# Patient Record
Sex: FEMALE | Race: OTHER | HISPANIC OR LATINO | URBAN - METROPOLITAN AREA
[De-identification: names, ages, dates, MRNs, and addresses within clinical notes are randomized per-mention and may not be internally consistent; named-entity substitution may affect disease eponyms.]

---

## 2024-01-11 ENCOUNTER — OUTPATIENT (OUTPATIENT)
Dept: INPATIENT UNIT | Facility: HOSPITAL | Age: 24
LOS: 1 days | End: 2024-01-11
Payer: MEDICAID

## 2024-01-11 VITALS — HEART RATE: 59 BPM | DIASTOLIC BLOOD PRESSURE: 68 MMHG | SYSTOLIC BLOOD PRESSURE: 112 MMHG

## 2024-01-11 VITALS — DIASTOLIC BLOOD PRESSURE: 71 MMHG | SYSTOLIC BLOOD PRESSURE: 116 MMHG | HEART RATE: 72 BPM

## 2024-01-11 DIAGNOSIS — O26.899 OTHER SPECIFIED PREGNANCY RELATED CONDITIONS, UNSPECIFIED TRIMESTER: ICD-10-CM

## 2024-01-11 LAB
HCT VFR BLD CALC: 34.5 % — SIGNIFICANT CHANGE UP (ref 34.5–45)
HCT VFR BLD CALC: 34.5 % — SIGNIFICANT CHANGE UP (ref 34.5–45)
HGB BLD-MCNC: 11.8 G/DL — SIGNIFICANT CHANGE UP (ref 11.5–15.5)
HGB BLD-MCNC: 11.8 G/DL — SIGNIFICANT CHANGE UP (ref 11.5–15.5)
HIV 1 & 2 AB SERPL IA.RAPID: SIGNIFICANT CHANGE UP
HIV 1 & 2 AB SERPL IA.RAPID: SIGNIFICANT CHANGE UP
MCHC RBC-ENTMCNC: 31.4 PG — SIGNIFICANT CHANGE UP (ref 27–34)
MCHC RBC-ENTMCNC: 31.4 PG — SIGNIFICANT CHANGE UP (ref 27–34)
MCHC RBC-ENTMCNC: 34.2 GM/DL — SIGNIFICANT CHANGE UP (ref 32–36)
MCHC RBC-ENTMCNC: 34.2 GM/DL — SIGNIFICANT CHANGE UP (ref 32–36)
MCV RBC AUTO: 91.8 FL — SIGNIFICANT CHANGE UP (ref 80–100)
MCV RBC AUTO: 91.8 FL — SIGNIFICANT CHANGE UP (ref 80–100)
PLATELET # BLD AUTO: 191 K/UL — SIGNIFICANT CHANGE UP (ref 150–400)
PLATELET # BLD AUTO: 191 K/UL — SIGNIFICANT CHANGE UP (ref 150–400)
RBC # BLD: 3.76 M/UL — LOW (ref 3.8–5.2)
RBC # BLD: 3.76 M/UL — LOW (ref 3.8–5.2)
RBC # FLD: 13.5 % — SIGNIFICANT CHANGE UP (ref 10.3–14.5)
RBC # FLD: 13.5 % — SIGNIFICANT CHANGE UP (ref 10.3–14.5)
WBC # BLD: 7.15 K/UL — SIGNIFICANT CHANGE UP (ref 3.8–10.5)
WBC # BLD: 7.15 K/UL — SIGNIFICANT CHANGE UP (ref 3.8–10.5)
WBC # FLD AUTO: 7.15 K/UL — SIGNIFICANT CHANGE UP (ref 3.8–10.5)
WBC # FLD AUTO: 7.15 K/UL — SIGNIFICANT CHANGE UP (ref 3.8–10.5)

## 2024-01-11 PROCEDURE — G0463: CPT

## 2024-01-11 PROCEDURE — 87070 CULTURE OTHR SPECIMN AEROBIC: CPT

## 2024-01-11 PROCEDURE — 59025 FETAL NON-STRESS TEST: CPT

## 2024-01-11 PROCEDURE — 36415 COLL VENOUS BLD VENIPUNCTURE: CPT

## 2024-01-11 PROCEDURE — 87389 HIV-1 AG W/HIV-1&-2 AB AG IA: CPT

## 2024-01-11 PROCEDURE — 86703 HIV-1/HIV-2 1 RESULT ANTBDY: CPT

## 2024-01-11 PROCEDURE — 86780 TREPONEMA PALLIDUM: CPT

## 2024-01-11 PROCEDURE — 85027 COMPLETE CBC AUTOMATED: CPT

## 2024-01-11 NOTE — OB RN TRIAGE NOTE - FALL HARM RISK - UNIVERSAL INTERVENTIONS
Bed in lowest position, wheels locked, appropriate side rails in place/Call bell, personal items and telephone in reach/Instruct patient to call for assistance before getting out of bed or chair/Non-slip footwear when patient is out of bed/[place] to call system/Physically safe environment - no spills, clutter or unnecessary equipment/Purposeful Proactive Rounding/Room/bathroom lighting operational, light cord in reach Bed in lowest position, wheels locked, appropriate side rails in place/Call bell, personal items and telephone in reach/Instruct patient to call for assistance before getting out of bed or chair/Non-slip footwear when patient is out of bed/[place] to call system/Physically safe environment - no spills, clutter or unnecessary equipment/Purposeful Proactive Rounding/Room/bathroom lighting operational, light cord in reach

## 2024-01-11 NOTE — OB PROVIDER TRIAGE NOTE - HISTORY OF PRESENT ILLNESS
ANGELO MACE is a 23y  at 38w3d weeks GA who presents to L&D for pelvic pressure and to establish care. Patient has been up to date with PNC in [facility] for the first 7 months of pregnancy, and was seen in [place] for the last month, but recently moved to [place] and is looking to establish care. Patient feels well, and has no issues with pregnancy    Pt denies vaginal bleeding, contractions and leakage of fluid. She endorses good fetal movement.   Pt denies trauma.   Pt denies headaches, visual disturbances, RUQ pain, epigastric pain and new-onset edema.   She denies any urinary complaints.   She denies fevers, chills, nausea, vomiting.   She denies shortness of breath, chest pain, and palpitations.    Pregnancy course is  uncomplicated.     POB: G1  PGYN: -fibroids/-cysts, denied STD hx, denies abnormal PAPs  PMH: Denies  PSH: Denies  SH: Denies tobacco use, EtOH use and illicit drug use during the pregnancy; Feels safe at home  Meds: PNV  All: NKDA         ANGELO MACE is a 23y  at 38w3d weeks GA who presents to L&D for pelvic pressure and to establish care. Patient has been up to date with PNC in [facility] for the first 7 months of pregnancy, and was seen in [place] for the last month, but recently moved to [place] and is looking to establish care. Patient feels well, and has no issues with pregnancy    Pt denies vaginal bleeding, contractions and leakage of fluid. She endorses good fetal movement.   Pt denies trauma.   Pt denies headaches, visual disturbances, RUQ pain, epigastric pain and new-onset edema.   She denies any urinary complaints.   She denies fevers, chills, nausea, vomiting.   She denies shortness of breath, chest pain, and palpitations.    Pregnancy course is  uncomplicated.     POB: G1  PGYN: -fibroids/-cysts, denied STD hx, denies abnormal PAPs  PMH: Denies  PSH: Denies  SH: Denies tobacco use, EtOH use and illicit drug use during the pregnancy; Feels safe at home  Meds: PNV  All: NKDA         ANGELO MACE is a 23y  at 38w3d weeks GA who presents to L&D for pelvic pressure and to establish care. Patient has been up to date with PNC in [facility] for the first 7 months of pregnancy, and was seen in [place] for the last month, but recently moved to [place] and is looking to establish care. Patient feels well, and has no issues with pregnancy    Pt denies vaginal bleeding, contractions and leakage of fluid. She endorses good fetal movement.   Pt denies trauma.   Pt denies headaches, visual disturbances, RUQ pain, epigastric pain and new-onset edema.   She denies any urinary complaints.   She denies fevers, chills, nausea, vomiting.   She denies shortness of breath, chest pain, and palpitations.    Pregnancy course is  uncomplicated.     POB: G1  PGYN: -fibroids/-cysts, denied STD hx, denies abnormal PAPs  PMH: Denies  PSH: Denies  SH: Denies tobacco use, EtOH use and illicit drug use during the pregnancy; Feels safe at home  Meds: PNV  All: NKDA         ANGELO MACE is a 23y  at 38w3d weeks GA who presents to L&D for pelvic pressure and to establish care. Patient has been up to date with PNC in [facility] for the first 7 months of pregnancy, and was seen in [place] for the last month, but recently moved to [place] and is looking to establish care. Patient feels well, and has no issues with pregnancy    Pt denies vaginal bleeding, contractions and leakage of fluid. She endorses good fetal movement.   Pt denies trauma.   Pt denies headaches, visual disturbances, RUQ pain, epigastric pain and new-onset edema.   She denies any urinary complaints.   She denies fevers, chills, nausea, vomiting.   She denies shortness of breath, chest pain, and palpitations.    Pregnancy course is  uncomplicated.     POB: G1  PGYN: -fibroids/-cysts, denied STD hx, denies abnormal PAPs  PMH: Denies  PSH: Denies  SH: Denies tobacco use, EtOH use and illicit drug use during the pregnancy; Feels safe at home  Meds: PNV  All: NKDA

## 2024-01-11 NOTE — OB PROVIDER TRIAGE NOTE - NSHPPHYSICALEXAM_GEN_ALL_CORE
T(C): 36.7 (01-11-24 @ 17:23), Max: 36.7 (01-11-24 @ 17:23)  HR: 72 (01-11-24 @ 17:23) (72 - 72)  BP: 116/71 (01-11-24 @ 17:23) (116/71 - 116/71)  RR: 20 (01-11-24 @ 17:23) (20 - 20)    Gen: NAD, well-appearing  Heart: S1 S2, RRR  Lungs: CTAB  Abd: soft, gravid  Ext: non-edematous, non-tender   SVE: 0/0/-3  SSE: cervix visualized, closed and without any signs of bleeding or drainage, no pooling   FHT: 120 baseline, moderate variability, + accels, -decels  [place]:No contractions T(C): 36.7 (01-11-24 @ 17:23), Max: 36.7 (01-11-24 @ 17:23)  HR: 72 (01-11-24 @ 17:23) (72 - 72)  BP: 116/71 (01-11-24 @ 17:23) (116/71 - 116/71)  RR: 20 (01-11-24 @ 17:23) (20 - 20)    Gen: NAD, well-appearing  Heart: S1 S2, RRR  Lungs: CTAB  Abd: soft, gravid  Ext: non-edematous, non-tender   SVE: 0/0/-3  SSE: cervix visualized, closed and without any signs of bleeding or drainage, no pooling   FHT: 120 baseline, moderate variability, + accels, -decels  [place]:No contractions T(C): 36.7 (01-11-24 @ 17:23), Max: 36.7 (01-11-24 @ 17:23)  HR: 72 (01-11-24 @ 17:23) (72 - 72)  BP: 116/71 (01-11-24 @ 17:23) (116/71 - 116/71)  RR: 20 (01-11-24 @ 17:23) (20 - 20)    Gen: NAD, well-appearing  Heart: S1 S2, RRR  Lungs: CTAB  Abd: soft, gravid  Ext: non-edematous, non-tender   SVE: 0/0/-3  SSE: cervix visualized, closed and without any signs of bleeding or drainage, no pooling   FHT: 120 baseline, moderate variability, + accels, -decels  [place]:No contractions T(C): 36.7 (01-11-24 @ 17:23), Max: 36.7 (01-11-24 @ 17:23)  HR: 72 (01-11-24 @ 17:23) (72 - 72)  BP: 116/71 (01-11-24 @ 17:23) (116/71 - 116/71)  RR: 20 (01-11-24 @ 17:23) (20 - 20)    Gen: NAD, well-appearing  Heart: S1 S2, RRR  Lungs: CTAB  Abd: soft, gravid  Ext: non-edematous, non-tender   SVE: 0/0/-3  SSE: cervix visualized, closed and without any signs of bleeding or drainage, no pooling   FHT: 120 baseline, moderate variability, + accels, -decels  [place]:No contractions

## 2024-01-11 NOTE — OB RN TRIAGE NOTE - NS_DISCHARGEPRINT_OBGYN_ALL_OB
[place] [facility] OB Triage Discharge Instructions [place] [facility] OB Triage Discharge Instructions

## 2024-01-11 NOTE — OB RN TRIAGE NOTE - NSNAMEOFMDOFFICE_OBGYN_ALL_OB_FT
provider in [facility] provider in [facility] provider in [facility], provider also in [place] (has some records) provider in [facility], provider also in [place] (has some records)

## 2024-01-11 NOTE — OB PROVIDER TRIAGE NOTE - NSOBPROVIDERNOTE_OBGYN_ALL_OB_FT
A/P: ANGELO MACE is a 23y  at 38w3d weeks GA who presents to L&D for pelvic pressure and to establish care    SVE 0/0/-3  36 week labs collected: CBC, RPR, GBS, HIV  EFW: pending  Fetus: Reactive and reassuring  [place]: No contractions  Dispo: Refer to Dr. Dior to establish care, Discharge home with Labor precautions    Discussed with Dr. Dior, Dr. Spencer A/P: ANGELO MACE is a 23y  at 38w3d weeks GA who presents to L&D for pelvic pressure and to establish care    SVE 0/0/-3  36 week labs collected: CBC, RPR, GBS, HIV  EFW: pending  Fetus: Reactive and reassuring  [place]: No contractions  Dispo: Refer to Dr. Dior to establish care, Discharge home with Labor precautions    Discussed with Dr. Dior, Dr. Spencer A/P: ANGELO MACE is a 23y  at 38w3d weeks GA who presents to L&D for pelvic pressure and to establish care    SVE 0/0/-3  36 week labs collected: CBC, RPR, GBS, HIV  EFW: pending  Fetus: Reactive and reassuring  [place]: No contractions  Dispo: Refer to Dr. Dior to establish care, Discharge home with Labor precautions    Discussed with Dr. Dior, Dr. Spencer A/P: ANGELO MACE is a 23y  at 38w3d weeks GA who presents to L&D for pelvic pressure and to establish care    SVE 0/0/-3  36 week labs collected: CBC, RPR, GBS, HIV  EFW: pending  Fetus: Reactive and reassuring  [place]: No contractions  Dispo: Refer to Dr. Dior to establish care, Discharge home with Labor precautions    Discussed with Dr. Dior, Dr. Spencer A/P: ANGELO MACE is a 23y  at 38w3d weeks GA who presents to L&D for pelvic pressure and to establish care    SVE 0/0/-3  36 week labs collected: CBC, RPR, GBS, HIV  EFW: 2112g measuring at 32 weeks, should have follow up MFM ultrasound given mismatch in dates  Vertex, posterior, MACKENZIE 8.4cm  Fetus: Reactive and reassuring  [place]: No contractions  Dispo: Refer to Dr. Dior to establish care, Discharge home with Labor precautions    Discussed with Dr. Dior, Dr. Spencer A/P: ANGELO MACE is a 23y  at 38w3d weeks GA who presents to L&D for pelvic pressure and to establish care    SVE 0/0/-3  36 week labs collected: CBC, RPR, GBS, HIV  EFW: 2112g measuring at 32 weeks, should have follow up MFM ultrasound given mismatch in dates  Vertex, posterior, MACKENZIE 8.4cm  Fetus: Reactive and reassuring  [place]: No contractions  Dispo: Refer to Dr. Dior to establish care, Discharge home with Labor precautions    Discussed with Dr. Dior, Dr. Spencer A/P: ANGELO MACE is a 23y  at 38w3d weeks GA who presents to L&D for pelvic pressure and to establish care    SVE 0/0/-3  36 week labs collected: CBC, RPR, GBS, HIV  EFW: 2112g measuring at 32 weeks, should have follow up MFM ultrasound given mismatch in dates  Vertex, posterior, MACKENZIE 8.4cm  Fetus: Reactive and reassuring  [place]: No contractions  Dispo: Refer to Dr. Dior to establish care, Discharge home with Labor precautions    Discussed with Dr. Dior, Dr. Spencer A/P: ANGELO MACE is a 23y  at 38w3d weeks GA who presents to L&D for pelvic pressure and to establish care    SVE 0/0/-3  36 week labs collected: CBC, RPR, GBS, HIV  EFW: 2112g measuring at 32 weeks, should have follow up MFM ultrasound given mismatch in dates  Vertex, posterior, MACKENZIE 8.4cm  Fetus: Reactive and reassuring  [place]: No contractions  Dispo: Refer to Dr. Dior to establish care, Discharge home with Labor precautions    Discussed with Dr. Dior, Dr. Spencer

## 2024-01-12 DIAGNOSIS — O26.893 OTHER SPECIFIED PREGNANCY RELATED CONDITIONS, THIRD TRIMESTER: ICD-10-CM

## 2024-01-12 DIAGNOSIS — Z3A.38 38 WEEKS GESTATION OF PREGNANCY: ICD-10-CM

## 2024-01-12 DIAGNOSIS — R10.2 PELVIC AND PERINEAL PAIN: ICD-10-CM

## 2024-01-12 LAB
HIV 1+2 AB+HIV1 P24 AG SERPL QL IA: SIGNIFICANT CHANGE UP
HIV 1+2 AB+HIV1 P24 AG SERPL QL IA: SIGNIFICANT CHANGE UP
T PALLIDUM AB TITR SER: NEGATIVE — SIGNIFICANT CHANGE UP
T PALLIDUM AB TITR SER: NEGATIVE — SIGNIFICANT CHANGE UP

## 2024-01-14 LAB
CULTURE RESULTS: SIGNIFICANT CHANGE UP
CULTURE RESULTS: SIGNIFICANT CHANGE UP
SPECIMEN SOURCE: SIGNIFICANT CHANGE UP
SPECIMEN SOURCE: SIGNIFICANT CHANGE UP

## 2024-01-23 ENCOUNTER — INPATIENT (INPATIENT)
Facility: HOSPITAL | Age: 24
LOS: 1 days | Discharge: HOME | End: 2024-01-25
Attending: OBSTETRICS & GYNECOLOGY | Admitting: OBSTETRICS & GYNECOLOGY
Payer: MEDICAID

## 2024-01-23 VITALS — SYSTOLIC BLOOD PRESSURE: 120 MMHG | HEART RATE: 64 BPM | DIASTOLIC BLOOD PRESSURE: 76 MMHG

## 2024-01-23 DIAGNOSIS — O26.899 OTHER SPECIFIED PREGNANCY RELATED CONDITIONS, UNSPECIFIED TRIMESTER: ICD-10-CM

## 2024-01-23 DIAGNOSIS — O26.893 OTHER SPECIFIED PREGNANCY RELATED CONDITIONS, THIRD TRIMESTER: ICD-10-CM

## 2024-01-23 PROBLEM — Z78.9 OTHER SPECIFIED HEALTH STATUS: Chronic | Status: ACTIVE | Noted: 2024-01-11

## 2024-01-23 LAB
ABO RH CONFIRMATION: SIGNIFICANT CHANGE UP
BASOPHILS # BLD AUTO: 0.03 K/UL — SIGNIFICANT CHANGE UP (ref 0–0.2)
BASOPHILS NFR BLD AUTO: 0.3 % — SIGNIFICANT CHANGE UP (ref 0–2)
BLD GP AB SCN SERPL QL: SIGNIFICANT CHANGE UP
EOSINOPHIL # BLD AUTO: 0.03 K/UL — SIGNIFICANT CHANGE UP (ref 0–0.5)
EOSINOPHIL NFR BLD AUTO: 0.3 % — SIGNIFICANT CHANGE UP (ref 0–6)
HCT VFR BLD CALC: 41.6 % — SIGNIFICANT CHANGE UP (ref 34.5–45)
HGB BLD-MCNC: 14 G/DL — SIGNIFICANT CHANGE UP (ref 11.5–15.5)
IMM GRANULOCYTES NFR BLD AUTO: 0.7 % — SIGNIFICANT CHANGE UP (ref 0–0.9)
LYMPHOCYTES # BLD AUTO: 1.77 K/UL — SIGNIFICANT CHANGE UP (ref 1–3.3)
LYMPHOCYTES # BLD AUTO: 16.7 % — SIGNIFICANT CHANGE UP (ref 13–44)
MCHC RBC-ENTMCNC: 29.9 PG — SIGNIFICANT CHANGE UP (ref 27–34)
MCHC RBC-ENTMCNC: 33.7 GM/DL — SIGNIFICANT CHANGE UP (ref 32–36)
MCV RBC AUTO: 88.9 FL — SIGNIFICANT CHANGE UP (ref 80–100)
MONOCYTES # BLD AUTO: 0.38 K/UL — SIGNIFICANT CHANGE UP (ref 0–0.9)
MONOCYTES NFR BLD AUTO: 3.6 % — SIGNIFICANT CHANGE UP (ref 2–14)
NEUTROPHILS # BLD AUTO: 8.34 K/UL — HIGH (ref 1.8–7.4)
NEUTROPHILS NFR BLD AUTO: 78.4 % — HIGH (ref 43–77)
PLATELET # BLD AUTO: 231 K/UL — SIGNIFICANT CHANGE UP (ref 150–400)
RBC # BLD: 4.68 M/UL — SIGNIFICANT CHANGE UP (ref 3.8–5.2)
RBC # FLD: 13.8 % — SIGNIFICANT CHANGE UP (ref 10.3–14.5)
WBC # BLD: 10.62 K/UL — HIGH (ref 3.8–10.5)
WBC # FLD AUTO: 10.62 K/UL — HIGH (ref 3.8–10.5)

## 2024-01-23 PROCEDURE — 88307 TISSUE EXAM BY PATHOLOGIST: CPT | Mod: 26

## 2024-01-23 RX ORDER — OXYTOCIN 10 UNIT/ML
VIAL (ML) INJECTION
Qty: 30 | Refills: 0 | Status: DISCONTINUED | OUTPATIENT
Start: 2024-01-23 | End: 2024-01-24

## 2024-01-23 RX ORDER — SIMETHICONE 80 MG/1
80 TABLET, CHEWABLE ORAL EVERY 4 HOURS
Refills: 0 | Status: DISCONTINUED | OUTPATIENT
Start: 2024-01-23 | End: 2024-01-25

## 2024-01-23 RX ORDER — BENZOCAINE 10 %
1 GEL (GRAM) MUCOUS MEMBRANE EVERY 6 HOURS
Refills: 0 | Status: DISCONTINUED | OUTPATIENT
Start: 2024-01-23 | End: 2024-01-25

## 2024-01-23 RX ORDER — OXYTOCIN 10 UNIT/ML
41.67 VIAL (ML) INJECTION
Qty: 20 | Refills: 0 | Status: DISCONTINUED | OUTPATIENT
Start: 2024-01-23 | End: 2024-01-25

## 2024-01-23 RX ORDER — ONDANSETRON 8 MG/1
4 TABLET, FILM COATED ORAL ONCE
Refills: 0 | Status: COMPLETED | OUTPATIENT
Start: 2024-01-23 | End: 2024-01-23

## 2024-01-23 RX ORDER — TETANUS TOXOID, REDUCED DIPHTHERIA TOXOID AND ACELLULAR PERTUSSIS VACCINE, ADSORBED 5; 2.5; 8; 8; 2.5 [IU]/.5ML; [IU]/.5ML; UG/.5ML; UG/.5ML; UG/.5ML
0.5 SUSPENSION INTRAMUSCULAR ONCE
Refills: 0 | Status: DISCONTINUED | OUTPATIENT
Start: 2024-01-23 | End: 2024-01-25

## 2024-01-23 RX ORDER — CITRIC ACID/SODIUM CITRATE 300-500 MG
30 SOLUTION, ORAL ORAL ONCE
Refills: 0 | Status: DISCONTINUED | OUTPATIENT
Start: 2024-01-23 | End: 2024-01-24

## 2024-01-23 RX ORDER — IBUPROFEN 200 MG
600 TABLET ORAL EVERY 6 HOURS
Refills: 0 | Status: COMPLETED | OUTPATIENT
Start: 2024-01-23 | End: 2024-12-21

## 2024-01-23 RX ORDER — SODIUM CHLORIDE 9 MG/ML
3 INJECTION INTRAMUSCULAR; INTRAVENOUS; SUBCUTANEOUS EVERY 8 HOURS
Refills: 0 | Status: DISCONTINUED | OUTPATIENT
Start: 2024-01-23 | End: 2024-01-25

## 2024-01-23 RX ORDER — LANOLIN
1 OINTMENT (GRAM) TOPICAL EVERY 6 HOURS
Refills: 0 | Status: DISCONTINUED | OUTPATIENT
Start: 2024-01-23 | End: 2024-01-25

## 2024-01-23 RX ORDER — MAGNESIUM HYDROXIDE 400 MG/1
30 TABLET, CHEWABLE ORAL
Refills: 0 | Status: DISCONTINUED | OUTPATIENT
Start: 2024-01-23 | End: 2024-01-25

## 2024-01-23 RX ORDER — SODIUM CHLORIDE 9 MG/ML
1000 INJECTION, SOLUTION INTRAVENOUS
Refills: 0 | Status: DISCONTINUED | OUTPATIENT
Start: 2024-01-23 | End: 2024-01-24

## 2024-01-23 RX ORDER — KETOROLAC TROMETHAMINE 30 MG/ML
30 SYRINGE (ML) INJECTION ONCE
Refills: 0 | Status: DISCONTINUED | OUTPATIENT
Start: 2024-01-23 | End: 2024-01-23

## 2024-01-23 RX ORDER — DIBUCAINE 1 %
1 OINTMENT (GRAM) RECTAL EVERY 6 HOURS
Refills: 0 | Status: DISCONTINUED | OUTPATIENT
Start: 2024-01-23 | End: 2024-01-25

## 2024-01-23 RX ORDER — HYDROCORTISONE 1 %
1 OINTMENT (GRAM) TOPICAL EVERY 6 HOURS
Refills: 0 | Status: DISCONTINUED | OUTPATIENT
Start: 2024-01-23 | End: 2024-01-25

## 2024-01-23 RX ORDER — AER TRAVELER 0.5 G/1
1 SOLUTION RECTAL; TOPICAL EVERY 4 HOURS
Refills: 0 | Status: DISCONTINUED | OUTPATIENT
Start: 2024-01-23 | End: 2024-01-25

## 2024-01-23 RX ORDER — OXYCODONE HYDROCHLORIDE 5 MG/1
5 TABLET ORAL
Refills: 0 | Status: DISCONTINUED | OUTPATIENT
Start: 2024-01-23 | End: 2024-01-25

## 2024-01-23 RX ORDER — PRAMOXINE HYDROCHLORIDE 150 MG/15G
1 AEROSOL, FOAM RECTAL EVERY 4 HOURS
Refills: 0 | Status: DISCONTINUED | OUTPATIENT
Start: 2024-01-23 | End: 2024-01-25

## 2024-01-23 RX ORDER — CHLORHEXIDINE GLUCONATE 213 G/1000ML
1 SOLUTION TOPICAL DAILY
Refills: 0 | Status: DISCONTINUED | OUTPATIENT
Start: 2024-01-23 | End: 2024-01-24

## 2024-01-23 RX ORDER — DIPHENHYDRAMINE HCL 50 MG
25 CAPSULE ORAL EVERY 6 HOURS
Refills: 0 | Status: DISCONTINUED | OUTPATIENT
Start: 2024-01-23 | End: 2024-01-25

## 2024-01-23 RX ORDER — OXYCODONE HYDROCHLORIDE 5 MG/1
5 TABLET ORAL ONCE
Refills: 0 | Status: DISCONTINUED | OUTPATIENT
Start: 2024-01-23 | End: 2024-01-25

## 2024-01-23 RX ORDER — ACETAMINOPHEN 500 MG
975 TABLET ORAL
Refills: 0 | Status: DISCONTINUED | OUTPATIENT
Start: 2024-01-23 | End: 2024-01-25

## 2024-01-23 RX ORDER — OXYTOCIN 10 UNIT/ML
VIAL (ML) INJECTION
Qty: 20 | Refills: 0 | Status: COMPLETED | OUTPATIENT
Start: 2024-01-23 | End: 2024-01-23

## 2024-01-23 RX ADMIN — ONDANSETRON 4 MILLIGRAM(S): 8 TABLET, FILM COATED ORAL at 22:28

## 2024-01-23 RX ADMIN — Medication 1000 MILLIUNIT(S)/MIN: at 22:28

## 2024-01-23 RX ADMIN — Medication 30 MILLIGRAM(S): at 22:28

## 2024-01-23 RX ADMIN — SODIUM CHLORIDE 125 MILLILITER(S): 9 INJECTION, SOLUTION INTRAVENOUS at 16:44

## 2024-01-23 NOTE — OB PROVIDER LABOR PROGRESS NOTE - NS_SUBJECTIVE/OBJECTIVE_OBGYN_ALL_OB_FT
Patient resting comfortable
Pt seen bedside
Patient in discomfort, does not want epidural at this time  Agreeable to AROM

## 2024-01-23 NOTE — OB RN PATIENT PROFILE - FUNCTIONAL SCREEN CURRENT LEVEL: SWALLOWING (IF SCORE 2 OR MORE FOR ANY ITEM, CONSULT REHAB SERVICES), MLM)
Is This A New Presentation, Or A Follow-Up?: Skin Lesions
How Severe Is Your Skin Lesion?: mild
Has Your Skin Lesion Been Treated?: not been treated
0 = swallows foods/liquids without difficulty

## 2024-01-23 NOTE — OB RN PATIENT PROFILE - FUNCTIONAL ASSESSMENT - BASIC MOBILITY 4.
Propranolol Counseling:  I discussed with the patient the risks of propranolol including but not limited to low heart rate, low blood pressure, low blood sugar, restlessness and increased cold sensitivity. They should call the office if they experience any of these side effects. 4 = No assist / stand by assistance

## 2024-01-23 NOTE — OB PROVIDER LABOR PROGRESS NOTE - ASSESSMENT
A/P:  10/100/0  will labor down 30-40 min and then begin pushing as patient does not currently have an urge to push.
Cat 1 tracing  AROM, clear fluid  Consider pitocin if CTX space  reexam as indicated
Continue expt management

## 2024-01-23 NOTE — OB PROVIDER DELIVERY SUMMARY - NSPROVIDERDELIVERYNOTE_OBGYN_ALL_OB_FT
Patient noted to be fully dilated, and after a period of pushing, patient was prepped and draped for delivery. In conjunction with maternal effort, she delivered a viable male infant. Head delivered OA, nuchal cord x1 delivered through. After 30 sec, delayed cord clamping was performed then  was provided to bedside pediatric team. Cord blood collected for blood gas and G6PD deficiency testing. Placenta delivered spontaneously and intact, no gross pathology noted. Perineum and vagina were inspected and hemostatic abrasions were noted. Excellent hemostasis obtained.    EBL 156cc  Sex: M, Delivery Time: ,  Weight: 2990g, APGARs: 9/9 Patient noted to be fully dilated, and after a period of pushing, patient was prepped and draped for delivery. In conjunction with maternal effort, she delivered a viable male infant. Head delivered OA, nuchal cord x1 delivered through. After 30 sec, delayed cord clamping was performed then  was provided to bedside pediatric team. Cord blood collected for blood gas and G6PD deficiency testing. Placenta delivered spontaneously and intact, no gross pathology noted. Perineum and vagina were inspected and a left labial tear was noted and repaired with 2-0 Vicryl. Excellent hemostasis obtained.    EBL 156cc  Sex: M, Delivery Time: , [place] Weight: 2990g, APGARs: 9/9

## 2024-01-23 NOTE — OB PROVIDER H&P - NSHPPHYSICALEXAM_GEN_ALL_CORE
Vital Signs Last 24 Hrs  T(C): 37.1 (23 Jan 2024 11:45), Max: 37.1 (23 Jan 2024 11:45)  T(F): 98.8 (23 Jan 2024 11:45), Max: 98.8 (23 Jan 2024 11:45)  HR: 64 (23 Jan 2024 11:45) (64 - 64)  BP: 120/76 (23 Jan 2024 11:45) (120/76 - 120/76)    RR: 18 (23 Jan 2024 11:45) (18 - 18)          Gen: NAD, well-appearing  Abd: soft, gravid  Ext: non-edematous, non-tender   SVE: 3/90/-2    FHT: 120 baseline, moderate variability, + accels, -decels  [place]: Irregular contractions

## 2024-01-23 NOTE — OB PROVIDER H&P - ASSESSMENT
A/P: ANGELO MACE is a 23y  at 40w2d weeks GA who presents to L&D for labor at term  -Patient has limited care  -Admit to L&D  -Consent  -Admission labs  -NPO, except ice chips   -IV fluids  -Labor: Intact. Latent. Amanda q3-4 min.   -Fetus: Cat I tracing. Continuous toco and fetal monitoring.   -GBS: Negative, no GBS ppx required   -Analgesia: PRN  -DVT ppx: Ambulate and SCD's while in bed     Discussed with Dr. Wiseman

## 2024-01-23 NOTE — OB RN DELIVERY SUMMARY - NS_SEPSISRSKCALC_OBGYN_ALL_OB_FT
EOS calculated successfully. EOS Risk Factor: 0.5/1000 live births ([facility] national incidence); GA=40w1d; Temp=98.8; ROM=7.083; GBS='Unknown'; Antibiotics='No antibiotics or any antibiotics < 2 hrs prior to birth'

## 2024-01-23 NOTE — OB PROVIDER H&P - NSLOWPPHRISK_OBGYN_A_OB
No previous uterine incision/[place] Pregnancy/Less than or equal to 4 previous vaginal births/No known bleeding disorder/No history of postpartum hemorrhage/No other PPH risks indicated

## 2024-01-23 NOTE — OB PROVIDER H&P - HIV: DATE, OB PROFILE
Refill Routing Note   Medication(s) are not appropriate for processing by [facility] for the following reason(s):        Non-participating provider    [facility] action(s):  Route               Appointments  past 12m or future 3m with PCP    Date Provider   Last Visit   Visit date not found Bib Vasquez MD   Next Visit   Visit date not found Bib Vasquez MD   ED visits in past 90 days: 0        Note composed:4:19 PM 01/19/2024          
11-Jan-2024

## 2024-01-23 NOTE — OB RN DELIVERY SUMMARY - NSSELHIDDEN_OBGYN_ALL_OB_FT
[NS_DeliveryAttending1_OBGYN_ALL_OB_FT:[email or web address]],[NS_DeliveryAssist1_OBGYN_ALL_OB_FT:[id number]=],[NS_DeliveryRN_OBGYN_ALL_OB_FT:[id number]=]

## 2024-01-23 NOTE — OB PROVIDER DELIVERY SUMMARY - NSSELHIDDEN_OBGYN_ALL_OB_FT
[NS_DeliveryAttending1_OBGYN_ALL_OB_FT:[email or web address]],[NS_DeliveryAssist1_OBGYN_ALL_OB_FT:[id number]=],[NS_DeliveryRN_OBGYN_ALL_OB_FT:[id number]=]

## 2024-01-23 NOTE — OB PROVIDER H&P - HISTORY OF PRESENT ILLNESS
ANGELO MACE is a 23y  at 40w2d weeks GA who presents to L&D for pelvic pressure and contractions. Patient has been up to date with PNC in [facility] for the first 7 months of pregnancy, and was seen in [place] for the last month, but recently moved to [place] and is established care with [facility] yesterday, dating was confirmed with [facility] sonogram. Patient feels well, and has no issues with pregnancy    Pt denies vaginal bleeding, contractions and leakage of fluid. She endorses good fetal movement.     Pregnancy course is  uncomplicated.     POB: G1  PGYN: -fibroids/-cysts, denied STD hx, denies abnormal PAPs  PMH: Denies  PSH: Denies  SH: Denies tobacco use, EtOH use and illicit drug use during the pregnancy; Feels safe at home  Meds: PNV  All: NKDA

## 2024-01-23 NOTE — OB RN DELIVERY SUMMARY - NS_GENERALBABYACOMMENTA_OBGYN_ALL_OB_FT
Patient did not want to do skin to skin immediately at birth because she was vomitting and having a repair done

## 2024-01-23 NOTE — OB RN PATIENT PROFILE - SPIRITUAL CULTURAL, CURRENT SITUATION, PROFILE
[place] - Telemetry  Discharge Reassessment    Primary Care Provider: [facility] - Family Medicine    Expected Discharge Date: Pending    FELIZ spoke to patient's wife, Aminta about discharge planning Aminta stated that she is patient's help at home. FELIZ notified Aminta that patient may discharge home on PO abx.    Reassessment (most recent)       Discharge Reassessment - 09/12/22 1610          Discharge Reassessment    Assessment Type Discharge Planning Reassessment     Did the patient's condition or plan change since previous assessment? No     Discharge Plan discussed with: Patient     Communicated SUSAN with patient/caregiver Date not available/Unable to determine     Discharge Plan A Home with family     Discharge Plan B Home with family     DME Needed Upon Discharge  none     Discharge Barriers Identified None     Why the patient remains in the hospital Requires continued medical care        Post-Acute Status    Coverage Medicaid     Discharge Delays None known at this time                        No

## 2024-01-23 NOTE — OB RN TRIAGE NOTE - FALL HARM RISK - UNIVERSAL INTERVENTIONS
Bed in lowest position, wheels locked, appropriate side rails in place/Call bell, personal items and telephone in reach/Instruct patient to call for assistance before getting out of bed or chair/Non-slip footwear when patient is out of bed/[place] to call system/Physically safe environment - no spills, clutter or unnecessary equipment/Purposeful Proactive Rounding/Room/bathroom lighting operational, light cord in reach

## 2024-01-23 NOTE — OB RN DELIVERY SUMMARY - NS_PROPHYLABX_OBGYN_ALL_OB
Result Notes for URINE CULTURE     Notes recorded by Alfonso Bruner MD on 9/10/2019 at 9:37 AM CDT  NO NEED TO CALL  ------    Notes recorded by Meghann Richards RN on 9/10/2019 at 9:29 AM CDT  SEE URINE CULTURE--CONTAMINATED SPEC.  ON CIPRO.  Please advise and send back to [facility] nurse ms pool.       Encounter closed.   N/A

## 2024-01-24 LAB
HAV IGM SER-ACNC: SIGNIFICANT CHANGE UP
HBV CORE IGM SER-ACNC: SIGNIFICANT CHANGE UP
HBV SURFACE AG SER-ACNC: SIGNIFICANT CHANGE UP
HCT VFR BLD CALC: 28.9 % — LOW (ref 34.5–45)
HCV AB S/CO SERPL IA: 0.14 S/CO — SIGNIFICANT CHANGE UP (ref 0–0.99)
HCV AB SERPL-IMP: SIGNIFICANT CHANGE UP
HGB BLD-MCNC: 9.3 G/DL — LOW (ref 11.5–15.5)
MEV IGG SER-ACNC: 18.6 AU/ML — SIGNIFICANT CHANGE UP
MEV IGG+IGM SER-IMP: POSITIVE — SIGNIFICANT CHANGE UP
RUBV IGG SER-ACNC: 0.8 INDEX — SIGNIFICANT CHANGE UP
RUBV IGG SER-IMP: NEGATIVE — SIGNIFICANT CHANGE UP
T PALLIDUM AB TITR SER: NEGATIVE — SIGNIFICANT CHANGE UP

## 2024-01-24 RX ORDER — IBUPROFEN 200 MG
1 TABLET ORAL
Qty: 28 | Refills: 0
Start: 2024-01-24 | End: 2024-01-30

## 2024-01-24 RX ORDER — ACETAMINOPHEN 500 MG
3 TABLET ORAL
Qty: 84 | Refills: 0
Start: 2024-01-24 | End: 2024-01-30

## 2024-01-24 RX ORDER — IBUPROFEN 200 MG
600 TABLET ORAL EVERY 6 HOURS
Refills: 0 | Status: DISCONTINUED | OUTPATIENT
Start: 2024-01-24 | End: 2024-01-25

## 2024-01-24 RX ADMIN — Medication 975 MILLIGRAM(S): at 22:02

## 2024-01-24 RX ADMIN — Medication 600 MILLIGRAM(S): at 05:25

## 2024-01-24 RX ADMIN — Medication 1 TABLET(S): at 12:55

## 2024-01-24 RX ADMIN — SODIUM CHLORIDE 3 MILLILITER(S): 9 INJECTION INTRAMUSCULAR; INTRAVENOUS; SUBCUTANEOUS at 22:37

## 2024-01-24 RX ADMIN — SODIUM CHLORIDE 3 MILLILITER(S): 9 INJECTION INTRAMUSCULAR; INTRAVENOUS; SUBCUTANEOUS at 14:00

## 2024-01-24 RX ADMIN — SODIUM CHLORIDE 3 MILLILITER(S): 9 INJECTION INTRAMUSCULAR; INTRAVENOUS; SUBCUTANEOUS at 06:28

## 2024-01-24 RX ADMIN — Medication 975 MILLIGRAM(S): at 09:27

## 2024-01-24 RX ADMIN — Medication 600 MILLIGRAM(S): at 12:55

## 2024-01-24 NOTE — DISCHARGE NOTE OB - MATERIALS PROVIDED
Guide to Postpartum Care/[facility] Hearing Screen Program/Back To Sleep Handout/Shaken Baby Prevention Handout/Breastfeeding Guide and Packet/Birth Certificate Instructions/Tdap Vaccination (VIS Pub Date: January 24, 2012)

## 2024-01-24 NOTE — DISCHARGE NOTE OB - NS MD DC FALL RISK RISK
For information on Fall & Injury Prevention, visit: [email or web address] OR  [email or web address] OR  https://www.cdc.gov/steadi/patient.html

## 2024-01-24 NOTE — DISCHARGE NOTE OB - PATIENT PORTAL LINK FT
You can access the FollowMyHealth Patient Portal offered by [facility] by registering at the following website: [email or web address]. By joining [facility]’s FollowMyHealth portal, you will also be able to view your health information using other applications (apps) compatible with our system.

## 2024-01-25 VITALS
HEART RATE: 69 BPM | TEMPERATURE: 98 F | RESPIRATION RATE: 16 BRPM | SYSTOLIC BLOOD PRESSURE: 103 MMHG | DIASTOLIC BLOOD PRESSURE: 68 MMHG | OXYGEN SATURATION: 99 %

## 2024-01-25 PROCEDURE — 85025 COMPLETE CBC W/AUTO DIFF WBC: CPT

## 2024-01-25 PROCEDURE — 86765 RUBEOLA ANTIBODY: CPT

## 2024-01-25 PROCEDURE — 86901 BLOOD TYPING SEROLOGIC RH(D): CPT

## 2024-01-25 PROCEDURE — 80074 ACUTE HEPATITIS PANEL: CPT

## 2024-01-25 PROCEDURE — 86762 RUBELLA ANTIBODY: CPT

## 2024-01-25 PROCEDURE — 86850 RBC ANTIBODY SCREEN: CPT

## 2024-01-25 PROCEDURE — 85014 HEMATOCRIT: CPT

## 2024-01-25 PROCEDURE — 90707 MMR VACCINE SC: CPT

## 2024-01-25 PROCEDURE — 85018 HEMOGLOBIN: CPT

## 2024-01-25 PROCEDURE — 59050 FETAL MONITOR W/REPORT: CPT

## 2024-01-25 PROCEDURE — 88307 TISSUE EXAM BY PATHOLOGIST: CPT

## 2024-01-25 PROCEDURE — 36415 COLL VENOUS BLD VENIPUNCTURE: CPT

## 2024-01-25 PROCEDURE — 86780 TREPONEMA PALLIDUM: CPT

## 2024-01-25 PROCEDURE — 86900 BLOOD TYPING SEROLOGIC ABO: CPT

## 2024-01-25 RX ADMIN — Medication 975 MILLIGRAM(S): at 09:37

## 2024-01-25 RX ADMIN — SODIUM CHLORIDE 3 MILLILITER(S): 9 INJECTION INTRAMUSCULAR; INTRAVENOUS; SUBCUTANEOUS at 05:53

## 2024-01-25 RX ADMIN — Medication 975 MILLIGRAM(S): at 02:38

## 2024-01-25 RX ADMIN — Medication 0.5 MILLILITER(S): at 12:37

## 2024-01-25 NOTE — PROGRESS NOTE ADULT - ATTENDING COMMENTS
PPD2  Doing well  meeting milestones  BC discussed- will let us know at 6 weeks  Hgb 14 --> 9.3- no signs or sx's of anemia  f/u in the office in 4-6 weeks for postpartum care  precautions provided  discharge home
PPD#1  Qamar mendez  meeting milestones  CBC pendin gfor this AM  continue current mgmt  ppalos

## 2024-01-25 NOTE — PROGRESS NOTE ADULT - SUBJECTIVE AND OBJECTIVE BOX
23y Female s/p labor epidural, CSE, on 01/23/2024    Vital Signs     T(C): 36.8 (24 Jan 2024 04:35), Max: 37.1 (24 Jan 2024 00:15)  T(F): 98.2 (24 Jan 2024 04:35), Max: 98.7 (24 Jan 2024 00:15)  HR: 67 (24 Jan 2024 04:35) (53 - 109)  BP: 101/67 (24 Jan 2024 04:35) (92/52 - 134/78)  BP(mean): --  RR: 18 (24 Jan 2024 04:35) (18 - 18)  SpO2: 99% (24 Jan 2024 04:35) (89% - 100%)    Parameters below as of 24 Jan 2024 04:35    Patient On (Oxygen Delivery Method): room air            Patient's overall anesthesia satisfaction: Positive    Patient seen and doing well     No headache      No residual numbness or weakness, sensory and motor function intact    No anesthetic complications or complaints noted or reported                 
ANGELO SANFORD is a 23y  now PPD#1 s/p spontaneous vaginal delivery at 40w2d weeks gestation, uncomplicated.    S:    The patient has no complaints.  Pain controlled with current treatment regimen.   She is ambulating without difficulty and tolerating PO.   + flatus/-BM/+ voiding   She endorses appropriate lochia, which is decreasing.   She is breastfeeding without difficulty.   Denies HA, CP, SOB, leg pain    O:    T(C): 36.8 (24 @ 04:35), Max: 37.1 (24 @ 11:45)  HR: 67 (24 @ 04:35) (53 - 109)  BP: 101/67 (24 @ 04:35) (92/52 - 134/78)  RR: 18 (24 @ 04:35) (18 - 18)  SpO2: 99% (24 @ 04:35) (89% - 100%)    Gen: NAD, AOx3  Breast: Nontender, non-engorged   Abdomen:  Soft, non-tender, non-distended  Uterus:  Fundus firm below umbilicus  VE:  +Lochia  Ext:  Non-tender and non-edematous                          14.0   10.62 )-----------( 231      ( 2024 12:15 )             41.6               
ANGELO SANFORD is a 23y  now PPD#2 s/p spontaneous vaginal delivery at 40w2d weeks gestation, uncomplicated.    S:    The patient has no complaints.  Pain controlled with current treatment regimen.   She is ambulating without difficulty and tolerating PO.   + flatus/-BM/+ voiding   She endorses appropriate lochia, which is decreasing.   She is breastfeeding without difficulty.   Denies HA, CP, SOB, leg pain    O:    Vital Signs Last 24 Hrs  T(C): 36.8 (2024 04:33), Max: 36.8 (2024 15:27)  T(F): 98.3 (2024 04:33), Max: 98.3 (2024 04:33)  HR: 69 (2024 04:33) (69 - 72)  BP: 103/68 (2024 04:33) (103/68 - 107/69)  BP(mean): --  RR: 16 (2024 04:33) (16 - 18)  SpO2: 99% (2024 04:33) (99% - 100%)    Gen: NAD, AOx3  Breast: Nontender, non-engorged   Abdomen:  Soft, non-tender, non-distended  Uterus:  Fundus firm below umbilicus  VE:  +Lochia  Ext:  Non-tender and non-edematous                          14.0   10.62 )-----------( 231      ( 2024 12:15 )             41.6                             9.3    x     )-----------( x        ( 2024 06:00 )             28.9

## 2024-01-25 NOTE — PROGRESS NOTE ADULT - ASSESSMENT
A/P:  23y  now PPD#2 s/p spontaneous vaginal delivery at 40w2d weeks gestation, uncomplicated.  -Vital signs stable  -Hgb: 14 -> 9.3, asymptomatic  -Voiding, tolerating PO, bowel function nml   -Advance care as tolerated   -Continue routine postpartum care and education  -Healthy male infant, declines circumcision  -Dispo: Discharge home today pending attending approval
A/P:  23y  now PPD#1 s/p spontaneous vaginal delivery at 40w2d weeks gestation, uncomplicated.  -Vital signs stable  -Hgb: 14 -> AM labs pending   -Voiding, tolerating PO, bowel function nml   -Advance care as tolerated   -Continue routine postpartum care and education  -Healthy male infant, declines circumcision  -Dispo: Continue inpatient monitoring

## 2024-02-26 LAB — SURGICAL PATHOLOGY STUDY: SIGNIFICANT CHANGE UP

## 2025-07-25 ENCOUNTER — EMERGENCY (EMERGENCY)
Facility: HOSPITAL | Age: 25
LOS: 1 days | End: 2025-07-25
Attending: STUDENT IN AN ORGANIZED HEALTH CARE EDUCATION/TRAINING PROGRAM
Payer: COMMERCIAL

## 2025-07-25 VITALS
DIASTOLIC BLOOD PRESSURE: 58 MMHG | WEIGHT: 126.1 LBS | HEIGHT: 61.02 IN | RESPIRATION RATE: 18 BRPM | TEMPERATURE: 98 F | HEART RATE: 70 BPM | SYSTOLIC BLOOD PRESSURE: 106 MMHG | OXYGEN SATURATION: 99 %

## 2025-07-25 PROCEDURE — 99283 EMERGENCY DEPT VISIT LOW MDM: CPT

## 2025-07-25 PROCEDURE — T1013: CPT

## 2025-07-25 PROCEDURE — 99284 EMERGENCY DEPT VISIT MOD MDM: CPT

## 2025-07-25 RX ORDER — CIPROFLOXACIN 6 MG/ML
1 SUSPENSION INTRATYMPANIC
Qty: 1 | Refills: 0
Start: 2025-07-25 | End: 2025-08-03

## 2025-07-25 NOTE — ED PROVIDER NOTE - PATIENT PORTAL LINK FT
You can access the FollowMyHealth Patient Portal offered by [facility] by registering at the following website: [email or web address]. By joining [employer]’s FollowMyHealth portal, you will also be able to view your health information using other applications (apps) compatible with our system.

## 2025-07-25 NOTE — ED ADULT TRIAGE NOTE - LANGUAGE ASSISTANCE NEEDED
"Blood pressure (!) 148/93, pulse 95, temperature 98.6  F (37  C), temperature source Oral, resp. rate 18, height 1.727 m (5' 8\"), weight 69 kg (152 lb 1.9 oz), SpO2 97%.  Pt VSS, SBP<160, no PRNs needed.  A&O x 4.  C/o mild pain at LLE incision, scheduled Tylenol given with good relief.  NJ in place with TF's infusing at goal 2050-1906.  Soft and bite sized diet with moderately thick liquids, calorie counts.  Poor appetite, eating small amounts.  Loose BM x 3.  Adequate UOP, using urinal independently.  Heparin gtt infusing at 1400 units/hr.  Midline abd incision dressing changed per orders.  Abdominal binder in place at all times.  LLE wound vac CDI, continuous 125.  Up with lift to chair x 2.  PIV x 2.  Call light within reach.  Continue with plan of care.                         " Yes-Patient/Caregiver accepts free interpretation services...

## 2025-07-25 NOTE — ED PROVIDER NOTE - NSFOLLOWUPINSTRUCTIONS_ED_ALL_ED_FT
** You are prescribed ocufloxacin. Take as directed by your pharmacists.   ** Take over the counter Tylenol or Ibuprofen for pain -- follow dosing directions on original bottle.    ** Follow up with your primary care doctor in the next 72 hours.      ** Go to the nearest Emergency Department if you experience any new or concerning symptoms, such as:   - worsening pain  - chest pain  - difficulty breathing  - passing out  - unable to eat or drink  - unable to move or feel part of your body  - fever, chills

## 2025-07-25 NOTE — ED ADULT NURSE NOTE - DATE/TIME OF ACCEPTANCE
Fabiola Alvarez [facility]. Timed staff message created.    25-Jul-2025 15:06

## 2025-07-25 NOTE — ED ADULT TRIAGE NOTE - CHIEF COMPLAINT QUOTE
C/O right ear pain x3 days. "I stuck a q tip in my ear and I saw blood". Pt states she had fever yesterday.

## 2025-07-25 NOTE — ED PROVIDER NOTE - CLINICAL SUMMARY MEDICAL DECISION MAKING FREE TEXT BOX
---------  Dina Ballesteros MD, Attending  25 yo F no sig pmhx, pw R ear and face pain. Pt put Q tip in ear 3 days ago and saw blood. since then pt has been having progressive pain, right sided headache and subjective fevers.   Gen: Well appearing in NAD   Head: NC/AT, L ear normal canal and TM. R ear with tiny abrasion and minimal erythema in ear canal. clear R TM. no mastoid ttp, no face erythema.   Neck: trachea midline  Resp:  No distress  Ext: no deformities  Neuro:  A&O appears non focal  Skin:  Warm and dry as visualized  Psych: anxious affect    ddx includes, but is not limited to the following: otitis externa, unlikely mastoiditis based on exam.   plan: otic medication, outpt followup with PMD.  update: see progress notes

## 2025-07-26 RX ORDER — CIPROFLOXACIN AND DEXAMETHASONE 3; 1 MG/ML; MG/ML
4 SUSPENSION/ DROPS AURICULAR (OTIC)
Qty: 1 | Refills: 0
Start: 2025-07-26 | End: 2025-08-01